# Patient Record
Sex: FEMALE | Race: BLACK OR AFRICAN AMERICAN | NOT HISPANIC OR LATINO
[De-identification: names, ages, dates, MRNs, and addresses within clinical notes are randomized per-mention and may not be internally consistent; named-entity substitution may affect disease eponyms.]

---

## 2023-01-11 PROBLEM — Z00.00 ENCOUNTER FOR PREVENTIVE HEALTH EXAMINATION: Status: ACTIVE | Noted: 2023-01-11

## 2023-02-02 ENCOUNTER — APPOINTMENT (OUTPATIENT)
Dept: ORTHOPEDIC SURGERY | Facility: CLINIC | Age: 58
End: 2023-02-02

## 2023-11-10 ENCOUNTER — EMERGENCY (EMERGENCY)
Facility: HOSPITAL | Age: 58
LOS: 1 days | Discharge: HOME | End: 2023-11-10
Attending: STUDENT IN AN ORGANIZED HEALTH CARE EDUCATION/TRAINING PROGRAM
Payer: COMMERCIAL

## 2023-11-10 VITALS
HEIGHT: 63 IN | WEIGHT: 179.02 LBS | TEMPERATURE: 99 F | SYSTOLIC BLOOD PRESSURE: 161 MMHG | RESPIRATION RATE: 20 BRPM | HEART RATE: 95 BPM | DIASTOLIC BLOOD PRESSURE: 81 MMHG | OXYGEN SATURATION: 99 %

## 2023-11-10 VITALS
DIASTOLIC BLOOD PRESSURE: 81 MMHG | OXYGEN SATURATION: 96 % | SYSTOLIC BLOOD PRESSURE: 147 MMHG | RESPIRATION RATE: 18 BRPM | HEART RATE: 79 BPM | TEMPERATURE: 98 F

## 2023-11-10 PROCEDURE — 99284 EMERGENCY DEPT VISIT MOD MDM: CPT

## 2023-11-10 RX ORDER — CYCLOBENZAPRINE HYDROCHLORIDE 10 MG/1
1 TABLET, FILM COATED ORAL
Qty: 14 | Refills: 0
Start: 2023-11-10 | End: 2023-11-16

## 2023-11-10 RX ORDER — DIAZEPAM 5 MG
5 TABLET ORAL ONCE
Refills: 0 | Status: DISCONTINUED | OUTPATIENT
Start: 2023-11-10 | End: 2023-11-10

## 2023-11-10 RX ORDER — IBUPROFEN 200 MG
600 TABLET ORAL ONCE
Refills: 0 | Status: COMPLETED | OUTPATIENT
Start: 2023-11-10 | End: 2023-11-10

## 2023-11-10 RX ORDER — ACETAMINOPHEN 500 MG
650 TABLET ORAL ONCE
Refills: 0 | Status: COMPLETED | OUTPATIENT
Start: 2023-11-10 | End: 2023-11-10

## 2023-11-10 RX ORDER — LIDOCAINE 4 G/100G
1 CREAM TOPICAL ONCE
Refills: 0 | Status: COMPLETED | OUTPATIENT
Start: 2023-11-10 | End: 2023-11-10

## 2023-11-10 RX ADMIN — Medication 5 MILLIGRAM(S): at 07:03

## 2023-11-10 RX ADMIN — LIDOCAINE 1 PATCH: 4 CREAM TOPICAL at 06:44

## 2023-11-10 RX ADMIN — Medication 650 MILLIGRAM(S): at 06:45

## 2023-11-10 RX ADMIN — Medication 650 MILLIGRAM(S): at 07:50

## 2023-11-10 RX ADMIN — Medication 600 MILLIGRAM(S): at 06:44

## 2023-11-10 RX ADMIN — Medication 600 MILLIGRAM(S): at 07:50

## 2023-11-10 NOTE — ED PROVIDER NOTE - PATIENT PORTAL LINK FT
You can access the FollowMyHealth Patient Portal offered by [facility] by registering at the following website: [email or web address]. By joining [facility]’s FollowMyHealth portal, you will also be able to view your health information using other applications (apps) compatible with our system.

## 2023-11-10 NOTE — ED PROVIDER NOTE - PROGRESS NOTE DETAILS
received sign out pt sleeping comfortably in the bed when I walked in her room, pt reports R buttock pain no midline spinal pain, radiating down the R leg from the buttocks to the knee, pt w/ 5/5 RLE strength Neuro:  5/5 bilateral hip flexion/extension, 5/5 bilateral knee flexion/extension, 5/5 bilateral dorsiflexion/plantarflexion, intact sensation in the bilateral legs to light touch,   pt w/ buttock tendernes son the R side, no midline spinal tenderness, pt w/ no urinary/fecal incontinence, pt w/ no saddle anesthesia,

## 2023-11-10 NOTE — ED ADULT NURSE NOTE - OBJECTIVE STATEMENT
58 y.o F AAO4 ambulatory presents to the ED c.o increasing back pain in her right lower back radiating down her leg. Pt states this happened last week and never in her life had pain like this. Pt told her PCP about it and told her to get an xray, pt couldn't wait to get xray because the pain was so bad so she came to the hospital. Pt denies and PMH or PSH. Pt denies CP, SOB, HA, vision changes, n/v/d, fevers chills, abdominal pain. Upon assessment, abdomen soft and nontender, +strong peripheral pulses, moving all extremities without difficulty.

## 2023-11-10 NOTE — ED PROVIDER NOTE - CLINICAL SUMMARY MEDICAL DECISION MAKING FREE TEXT BOX
59 yo f here for two weeks of right sided glute pain that radiates down her leg-shooting pain. atraumatic. reproducible to palpation. no other focal deficits. likely sciatica vs tight hamstrings. no lumbar spinal tenderness to get ct imaging to look for occult/pathologic fx. valium tylenol advil reassess 59 yo f here for two weeks of right sided glute pain that radiates down her leg-shooting pain. atraumatic. reproducible to palpation. no other focal deficits. likely sciatica vs tight hamstrings. no lumbar spinal tenderness to get ct imaging to look for occult/pathologic fx. valium tylenol advil reassess    pettet attending- see attending attestation for my mdm

## 2023-11-10 NOTE — ED PROVIDER NOTE - PHYSICAL EXAMINATION
GENERAL: well appearing in no acute distress, non-toxic appearing  CARDIAC: regular rate and rhythm, normal S1S2, no appreciable murmurs, 2+ pulses in UE/LE b/l  PULM: normal breath sounds, clear to ascultation bilaterally, no rales, rhonchi, wheezing  GI: abdomen nondistended, soft, nontender, no guarding, rebound tenderness  : no CVA tenderness b/l, no suprapubic tenderness  NEURO: no focal motor or sensory deficits,   MSK: tender to R piriformis with reproduction of symptoms, negative straight leg b/l. able to ambulate.

## 2023-11-10 NOTE — ED PROVIDER NOTE - OBJECTIVE STATEMENT
59 yo f no prior medical history presents to ed with two weeks of atraumatic right sided glute pain that radiates down her leg to her posterior knee. patient denies any injury. at home has been taking tylenol/advil with no relief. still able to ambulate but with difficulty due to pain. denies f/c/cp/abd pain/diarrhea/dysuria/ sensory changes to b/l lower extremity.

## 2023-11-10 NOTE — ED PROVIDER NOTE - ATTENDING CONTRIBUTION TO CARE
I, Oscar Whittington, performed a history and physical exam of the patient and discussed their management with the resident and/or advanced care provider. I reviewed the resident and/or advanced care provider's note and agree with the documented findings and plan of care. I was present and available for all procedures.    Well appearing and in NAD, head normal appearing atraumatic, trachea midline, no respiratory distress, lungs cta bilaterally, rrr no murmurs, soft NT ND abdomen, no visible extremity deformities, Alert and oriented, non focal neuro exam, skin warm and dry, normal affect and mood, no leg swelling, calf ttp or jvd no ctl spine midline ttp mostly r sided ps back pain amb wo assistance     The patient presents with acute onset of back pain. I believe this patient can be ruled out for serious pathology given there is a completely normal neurological exam, no history of malignancy, immunocompromised state, history of IV drug use, weight loss, saddle anesthesia, bowel or bladder incontinence, trauma to spine, fevers, chills, diaphoresis, acute bony tenderness, morning stiffness lasting >30 minutes. Patient was able to ambulate without assistance. Will give analgesia, reassess